# Patient Record
Sex: FEMALE | Race: AMERICAN INDIAN OR ALASKA NATIVE
[De-identification: names, ages, dates, MRNs, and addresses within clinical notes are randomized per-mention and may not be internally consistent; named-entity substitution may affect disease eponyms.]

---

## 2018-08-16 ENCOUNTER — HOSPITAL ENCOUNTER (EMERGENCY)
Dept: HOSPITAL 5 - ED | Age: 23
Discharge: LEFT BEFORE DISCHARGE | End: 2018-08-16
Payer: MEDICAID

## 2018-08-16 DIAGNOSIS — H57.8: Primary | ICD-10-CM

## 2018-08-16 DIAGNOSIS — Z53.21: ICD-10-CM

## 2022-01-18 ENCOUNTER — HOSPITAL ENCOUNTER (INPATIENT)
Dept: HOSPITAL 5 - TRG | Age: 27
LOS: 3 days | Discharge: HOME | End: 2022-01-21
Attending: OBSTETRICS & GYNECOLOGY | Admitting: OBSTETRICS & GYNECOLOGY
Payer: MEDICAID

## 2022-01-18 DIAGNOSIS — Z3A.38: ICD-10-CM

## 2022-01-18 DIAGNOSIS — U07.1: ICD-10-CM

## 2022-01-18 PROCEDURE — 86706 HEP B SURFACE ANTIBODY: CPT

## 2022-01-18 PROCEDURE — 85027 COMPLETE CBC AUTOMATED: CPT

## 2022-01-18 PROCEDURE — 87806 HIV AG W/HIV1&2 ANTB W/OPTIC: CPT

## 2022-01-18 PROCEDURE — 80307 DRUG TEST PRSMV CHEM ANLYZR: CPT

## 2022-01-18 PROCEDURE — 86900 BLOOD TYPING SEROLOGIC ABO: CPT

## 2022-01-18 PROCEDURE — G0463 HOSPITAL OUTPT CLINIC VISIT: HCPCS

## 2022-01-18 PROCEDURE — 86901 BLOOD TYPING SEROLOGIC RH(D): CPT

## 2022-01-18 PROCEDURE — 86592 SYPHILIS TEST NON-TREP QUAL: CPT

## 2022-01-18 PROCEDURE — 86762 RUBELLA ANTIBODY: CPT

## 2022-01-18 PROCEDURE — 85007 BL SMEAR W/DIFF WBC COUNT: CPT

## 2022-01-18 PROCEDURE — 36415 COLL VENOUS BLD VENIPUNCTURE: CPT

## 2022-01-18 PROCEDURE — 59025 FETAL NON-STRESS TEST: CPT

## 2022-01-18 PROCEDURE — 99211 OFF/OP EST MAY X REQ PHY/QHP: CPT

## 2022-01-18 PROCEDURE — U0003 INFECTIOUS AGENT DETECTION BY NUCLEIC ACID (DNA OR RNA); SEVERE ACUTE RESPIRATORY SYNDROME CORONAVIRUS 2 (SARS-COV-2) (CORONAVIRUS DISEASE [COVID-19]), AMPLIFIED PROBE TECHNIQUE, MAKING USE OF HIGH THROUGHPUT TECHNOLOGIES AS DESCRIBED BY CMS-2020-01-R: HCPCS

## 2022-01-18 PROCEDURE — 85025 COMPLETE CBC W/AUTO DIFF WBC: CPT

## 2022-01-18 PROCEDURE — 86803 HEPATITIS C AB TEST: CPT

## 2022-01-18 PROCEDURE — 86850 RBC ANTIBODY SCREEN: CPT

## 2022-01-19 LAB
BENZODIAZEPINES SCREEN,URINE: (no result)
HCT VFR BLD CALC: 25 % (ref 30.3–42.9)
HCT VFR BLD CALC: 25.8 % (ref 30.3–42.9)
HGB BLD-MCNC: 8.3 GM/DL (ref 10.1–14.3)
HGB BLD-MCNC: 8.3 GM/DL (ref 10.1–14.3)
MCHC RBC AUTO-ENTMCNC: 32 % (ref 30–34)
MCHC RBC AUTO-ENTMCNC: 33 % (ref 30–34)
MCV RBC AUTO: 91 FL (ref 79–97)
MCV RBC AUTO: 91 FL (ref 79–97)
METHADONE SCREEN,URINE: (no result)
OPIATE SCREEN,URINE: (no result)
PLATELET # BLD: 262 K/MM3 (ref 140–440)
PLATELET # BLD: 266 K/MM3 (ref 140–440)
RBC # BLD AUTO: 2.75 M/MM3 (ref 3.65–5.03)
RBC # BLD AUTO: 2.82 M/MM3 (ref 3.65–5.03)

## 2022-01-19 PROCEDURE — 3E0R3BZ INTRODUCTION OF ANESTHETIC AGENT INTO SPINAL CANAL, PERCUTANEOUS APPROACH: ICD-10-PCS | Performed by: OBSTETRICS & GYNECOLOGY

## 2022-01-19 PROCEDURE — 00HU33Z INSERTION OF INFUSION DEVICE INTO SPINAL CANAL, PERCUTANEOUS APPROACH: ICD-10-PCS | Performed by: OBSTETRICS & GYNECOLOGY

## 2022-01-19 PROCEDURE — 0UQGXZZ REPAIR VAGINA, EXTERNAL APPROACH: ICD-10-PCS | Performed by: OBSTETRICS & GYNECOLOGY

## 2022-01-19 RX ADMIN — SODIUM CHLORIDE, SODIUM LACTATE, POTASSIUM CHLORIDE, AND CALCIUM CHLORIDE SCH MLS/HR: .6; .31; .03; .02 INJECTION, SOLUTION INTRAVENOUS at 05:44

## 2022-01-19 RX ADMIN — IBUPROFEN SCH MG: 600 TABLET, FILM COATED ORAL at 08:44

## 2022-01-19 RX ADMIN — IBUPROFEN SCH MG: 600 TABLET, FILM COATED ORAL at 18:38

## 2022-01-19 RX ADMIN — SODIUM CHLORIDE, SODIUM LACTATE, POTASSIUM CHLORIDE, AND CALCIUM CHLORIDE SCH MLS/HR: .6; .31; .03; .02 INJECTION, SOLUTION INTRAVENOUS at 01:50

## 2022-01-19 NOTE — PROGRESS NOTE
Labor Epidural





- Labor Epidural


Start Time: 02:40


Stop Time: 03:02


Performed by:: PATRICK GRANT


Procedure: 





Patient is requesting epidural for labor pain. H&P and labs reviewed. Procedure 

explained, questions answered, consent obtained. Patient placed in sitting 

position with monitors applied. Timeout performed immediately before start of 

procedure. Prep/drape in usual sterile fashion. Skin localized 3 mL 1% lidocaine

at L[3]-L[4] interspace. 17-gauge Touhy epidural needle advanced to VADIM with 

saline at [4] cm. No blood/CSF noted via epidural needle.  Epidural catheter 

advanced to [8] cm. Negative aspiration for blood and CSF via catheter, negative

 response to test dose 3 ml 1.5% lidocaine w/ epi. Sterile dressing applied 

followed by tape reinforcement. Patient tolerated procedure well. No immediate 

complications noted.

## 2022-01-19 NOTE — ANESTHESIA CONSULTATION
Anesthesia Consult and Med Hx


Date of service: 01/19/22





- Airway


Anesthetic Teeth Evaluation: Poor


ROM Head & Neck: Adequate


Mental/Hyoid Distance: Adequate


Mallampati Class: Class II


Intubation Access Assessment: Probably Good





- Pulmonary Exam


CTA: Yes





- Cardiac Exam


Cardiac Exam: RRR





- Pre-Operative Health Status


ASA Pre-Surgery Classification: ASA3


Proposed Anesthetic Plan: Epidural





- Pulmonary


Hx Smoking: No


Hx Asthma: No


Hx Respiratory Symptoms: No


SOB: No


COPD: No


Home Oxygen Therapy: No


Hx Pneumonia: No


Hx Sleep Apnea: No





- Cardiovascular System


Hx Hypertension: No





- Central Nervous System


Hx Seizures: No


Hx Psychiatric Problems: No





- Endocrine


Hx Renal Disease: No


Hx End Stage Renal Disease: No


Hx Hypothyroidism: No


Hx Hyperthyroidism: No





- Hematic


Hx Anemia: Yes


Hx Sickle Cell Disease: No





- Other Systems


Hx Alcohol Use: No

## 2022-01-19 NOTE — HISTORY AND PHYSICAL REPORT
History of Present Illness


Date of examination: 22


Date of admission: 


22 00:30





Chief complaint: 





LOF followed by CTX


History of present illness: 





26 y/o  at 38 weeks presents to [facility] reporting LOF at 4:00 pm on 

2022.  CTX started thereafter.  No VB.  Good FM.





In [facility], SVE was 4 cm.





She is admitted in labor.





Past History


Past Medical History: no pertinent history


Past Surgical History: no surgical history


Family/Genetic History: none


Social history: no significant social history





- Obstetrical History


Expected Date of Delivery: 22


Actual Gestation: 38 Week(s) 0 Day(s) 


: 2


Para: 1





Medications and Allergies


                                    Allergies











Allergy/AdvReac Type Severity Reaction Status Date / Time


 


No Known Allergies Allergy   Unverified 10/20/15 18:55











                                Home Medications











 Medication  Instructions  Recorded  Confirmed  Last Taken  Type


 


No Known Home Medications [No  10/21/15 10/21/15 Unknown History





Reported Home Medications]     











Active Meds: 


Active Medications





Carboprost Tromethamine (Carboprost Tromethamine 250 Mcg/1 Ml Inj)  250 mcg IM 

ONCE PRN


   PRN Reason: Uterine Bleeding


Ephedrine Sulfate (Ephedrine Sulfate 50 Mg/1 Ml Inj)  10 mg IV Q2M PRN


   PRN Reason: Hypotension


Oxytocin/Sodium Chloride (Pitocin/Ns 30 Unit/500ml)  30 units in 500 mls @ 2 

mls/hr IV TITR SUMANTH; Protocol


Lactated Ringer's (Lactated Ringers)  1,000 mls @ 125 mls/hr IV AS DIRECT SUMANTH


   Last Admin: 22 05:44 Dose:  125 mls/hr


   


Oxytocin/Sodium Chloride (Pitocin/Ns 30 Unit/500ml)  30 units in 500 mls @ 40 

mls/hr IV TITR SUMANTH; Protocol


Fentanyl/Bupivacaine/Sodium Chlor (Fentanyl-Bupiv 2 Mcg/Ml-0.125%)  200 mcg in 

100 mls @ 12 mls/hr EPIDURAL TITR SUMANTH; Protocol


   Last Admin: 22 04:02 Dose:  12 mls/hr


   


Loperamide HCl (Loperamide 2 Mg Cap)  2 mg PO ONCE PRN


   PRN Reason: give with Hemabate


Methylergonovine Maleate (Methylergonovine Maleate 0.2 Mg/Ml Vial)  0.2 mg IM 

ONCE PRN


   PRN Reason: Uterine Bleeding


Mineral Oil (Mineral Oil 30 Ml Oral Liqd)  30 ml PO QHS PRN


   PRN Reason: Constipation


Misoprostol (Misoprostol 200 Mcg Tab)  800 mcg [place] ONCE PRN


   PRN Reason: Uterine Bleeding


Naloxone HCl (Naloxone 2 Mg/2 Ml Inj)  0.2 mg IV Q5M PRN


   PRN Reason: Respiratory sedation


Oxytocin (Oxytocin 10 Unit/1 Ml Inj)  10 unit IM ONCE PRN


   PRN Reason: Uterine Bleeding


Terbutaline Sulfate (Terbutaline 1 Mg/1 Ml Inj)  0.25 mg SUB-Q ONCE PRN


   PRN Reason: Hyperstimulation/Hypertonicity











Review of Systems


All systems: negative





- Vital Signs


Vital signs: 


                                   Vital Signs











Temp Pulse Resp BP Pulse Ox


 


 98.1 F   88   12   126/70   98 


 


 22 00:21  22 00:21  22 00:21  22 00:22 00:21








                                        











Temp Pulse Resp BP Pulse Ox


 


 98.1 F   87   12   116/52   93 


 


 22 00:22 06:55  22 00:22 06:55  22 06:55














- Physical Exam


Breasts: Positive: normal


Cardiovascular: Regular rate


Lungs: Positive: Normal air movement


Abdomen: Positive: normal appearance


Genitourinary (Female): Positive: normal external genitalia


Vulva: both: normal


Vagina: Positive: normal moisture


Uterus: Positive: enlarged


Adnexa: both: normal


Anus/Rectum: Positive: normal perianal skin


Extremities: Positive: normal


Deep Tendon Reflex Grade: Normal +2





- Obstetrical


FHR: category 1


Uterine Contraction Monitor Mode: External


Cervical Dilatation: 4





Results


Result Diagrams: 


                                 22 01:10





                              Abnormal lab results











  22 Range/Units





  01:10 


 


WBC  13.3 H  (4.5-11.0)  K/mm3


 


RBC  2.75 L  (3.65-5.03)  M/mm3


 


Hgb  8.3 L  (10.1-14.3)  gm/dl


 


Hct  25.0 L  (30.3-42.9)  %


 


RDW  16.5 H  (13.2-15.2)  %








All other labs normal.








Assessment and Plan





- Patient Problems


(1) 38 weeks gestation of pregnancy


Current Visit: Yes   Status: Acute   


Plan to address problem: 


Prenatal care at [facility].





Need records from office.





GBS status unknown at this time.  Will follow CDC MMWR 2010 guidelines.








(2) Labor abnormal


Current Visit: Yes   Status: Acute   


Plan to address problem: 


Admit to L&D.





Expectant management for now.

## 2022-01-19 NOTE — PROCEDURE NOTE
OB Delivery Note





- Delivery


Date of Delivery: 22


Surgeon: JOSEF RIBEIRO


Estimated blood loss: 300cc





- Vaginal


Delivery position: OA


Intrapartum events: none


Delivery induction: none


Delivery monitor: external FHT


Route of delivery: 


Delivery placenta: spontaneous


Episiotomy: none


Delivery laceration: vaginal side wall


Delivery repair: vicryl


Anesthesia: epidural





- Infant


  ** A


Apgar at 1 minute: 8


Apgar at 5 minutes: 9


Infant Gender: Male

## 2022-01-20 RX ADMIN — IBUPROFEN SCH MG: 600 TABLET, FILM COATED ORAL at 21:19

## 2022-01-20 RX ADMIN — IBUPROFEN SCH MG: 600 TABLET, FILM COATED ORAL at 00:07

## 2022-01-20 RX ADMIN — IBUPROFEN SCH MG: 600 TABLET, FILM COATED ORAL at 07:00

## 2022-01-20 RX ADMIN — HYDROCODONE BITARTRATE AND ACETAMINOPHEN PRN EACH: 5; 325 TABLET ORAL at 21:22

## 2022-01-20 RX ADMIN — HYDROCODONE BITARTRATE AND ACETAMINOPHEN PRN EACH: 5; 325 TABLET ORAL at 16:22

## 2022-01-20 RX ADMIN — HYDROCODONE BITARTRATE AND ACETAMINOPHEN PRN EACH: 5; 325 TABLET ORAL at 08:37

## 2022-01-20 RX ADMIN — IBUPROFEN SCH MG: 600 TABLET, FILM COATED ORAL at 12:31

## 2022-01-20 RX ADMIN — HYDROCODONE BITARTRATE AND ACETAMINOPHEN PRN EACH: 5; 325 TABLET ORAL at 01:58

## 2022-01-20 NOTE — PROGRESS NOTE
Assessment and Plan


PPD#1 , Sars Cov2+ doing well; leucocytosis noted


1. Routine postpartum care with isolation.  


2. Pt told that baby has to be observed for 48hrs per peds therefore no 

discharge home today


3. Will repeat cbc in am with leucocytosis seen


All questions encouraged and answered





Subjective


Date of service: 22


Principal diagnosis: PPD#1 , Sars CoV+


Interval history: 


pt has no complaints except lower back pain intermittent that is relieved with 

motrin.  Denies dysuria.  Vag bleed less than a period.  Bottle feeding only.  

Declines birth control.  Pt is ready to go home today.





Objective





- Constitutional


Vitals: 


                               Vital Signs - 12hr











  22





  08:10 08:40


 


Temperature 98.1 F 


 


Pulse Rate 60 


 


Respiratory 20 





Rate  


 


Blood Pressure 119/62 


 


O2 Sat by Pulse 98 





Oximetry  


 


O2 Sat by Pulse  98





Oximetry [  





Bilateral]  











General appearance: Present: no acute distress





- Respiratory


Respiratory effort: normal





- Cardiovascular


Rhythm: regular


Extremities: No edema





- Gastrointestinal


General gastrointestinal: Present: soft, non-tender





- Genitourinary


Female genitourinary: other (Fundus firm 2cm below umbilicus and non-tender; 

lochia small)





- Integumentary


Integumentary: warm, dry





- Neurologic


Neurologic: moves all extremities





- Psychiatric


Psychiatric: cooperative





- Labs


CBC & Chem 7: 


                                 22 10:39








Medications & Allergies





- Medications


Allergies/Adverse Reactions: 


                                    Allergies





No Known Allergies Allergy (Unverified 10/20/15 18:55)


   








Home Medications: 


                                Home Medications











 Medication  Instructions  Recorded  Confirmed  Last Taken  Type


 


No Known Home Medications [No  10/21/15 10/21/15 Unknown History





Reported Home Medications]     











Active Medications: 














Generic Name Dose Route Start Last Admin





  Trade Name Freq  PRN Reason Stop Dose Admin


 


Acetaminophen  650 mg  22 08:00 





  Acetaminophen 325 Mg Tab  PO  





  Q4H PRN  





  Pain MILD(1-3)/Fever >100.5/HA  


 


Hydrocodone Bitart/Acetaminophen  2 each  22 08:00  22 08:37





  Hydrocodone/Acetaminophen 5-325 Mg Tab  PO   2 each





  Q6H PRN   Administration





  Pain, Moderate (4-6)  


 


Bisacodyl  10 mg  22 10:00 





  Bisacodyl 10 Mg Rect Supp  SC  





  BID PRN  





  Constipation  


 


Carboprost Tromethamine  250 mcg  22 01:27 





  Carboprost Tromethamine 250 Mcg/1 Ml Inj  IM  





  ONCE PRN  





  Uterine Bleeding  


 


Diphenhydramine HCl  25 mg  22 07:30 





  Diphenhydramine 25 Mg Cap  PO  





  Q6H PRN  





  Itching  


 


Ephedrine Sulfate  10 mg  22 02:40 





  Ephedrine Sulfate 50 Mg/1 Ml Inj  IV  





  Q2M PRN  





  Hypotension  


 


Oxytocin/Sodium Chloride  30 units in 500 mls @ 2 mls/hr  22 02:00 





  Pitocin/Ns 30 Unit/500ml  IV  





  TITR SUMANTH  





  Protocol  


 


Lactated Ringer's  1,000 mls @ 125 mls/hr  22 01:30  22 05:44





  Lactated Ringers  IV   125 mls/hr





  AS DIRECT SUMANTH   Administration


 


Oxytocin/Sodium Chloride  30 units in 500 mls @ 40 mls/hr  22 02:00 





  Pitocin/Ns 30 Unit/500ml  IV  





  TITR SUMANTH  





  Protocol  


 


Fentanyl/Bupivacaine/Sodium Chlor  200 mcg in 100 mls @ 12 mls/hr  22 

03:00  22 04:02





  Fentanyl-Bupiv 2 Mcg/Ml-0.125%  EPIDURAL   12 mls/hr





  TITR SUMANTH   Administration





  Protocol  


 


Ibuprofen  600 mg  22 08:00  22 12:31





  Ibuprofen 600 Mg Tab  PO   600 mg





  Q6H SUMANTH   Administration


 


Loperamide HCl  2 mg  22 01:27 





  Loperamide 2 Mg Cap  PO  





  ONCE PRN  





  give with Hemabate  


 


Magnesium Hydroxide  30 ml  22 22:00 





  Magnesium Hydroxide (Mom) Oral Liqd Udc  PO  





  HS PRN  





  Constipation  


 


Methylergonovine Maleate  0.2 mg  22 01:27 





  Methylergonovine Maleate 0.2 Mg/Ml Vial  IM  





  ONCE PRN  





  Uterine Bleeding  


 


Mineral Oil  30 ml  22 01:27 





  Mineral Oil 30 Ml Oral Liqd  PO  





  QHS PRN  





  Constipation  


 


Misoprostol  800 mcg  22 01:27 





  Misoprostol 200 Mcg Tab  SC  





  ONCE PRN  





  Uterine Bleeding  


 


Multi-Ingredient Ointment  1 applic  22 08:00  22 10:15





  Lanolin/Zinc/Dimethicone (Lansinoh) 7 Gm  TP   1 applic





  PRN PRN   Administration





  Sore Nipples  


 


Naloxone HCl  0.2 mg  22 02:40 





  Naloxone 2 Mg/2 Ml Inj  IV  





  Q5M PRN  





  Respiratory sedation  


 


Ondansetron HCl  4 mg  22 08:00 





  Ondansetron 4 Mg/2 Ml Inj  IV  





  Q8H PRN  





  Nausea And Vomiting  


 


Oxytocin  10 unit  22 01:27 





  Oxytocin 10 Unit/1 Ml Inj  IM  





  ONCE PRN  





  Uterine Bleeding  


 


Promethazine HCl  25 mg  22 08:00 





  Promethazine 25 Mg Rect Supp  SC  





  Q6H PRN  





  Nausea And Vomiting  


 


Promethazine HCl  25 mg  22 08:00 





  Promethazine 25 Mg Tab  PO  





  Q6H PRN  





  Nausea And Vomiting  


 


Sodium Chloride  10 ml  22 08:00 





  Sodium Chloride 0.9% 10 Ml Flush Syringe  IV  22 07:59 





  PRN NR  


 


Terbutaline Sulfate  0.25 mg  22 01:27 





  Terbutaline 1 Mg/1 Ml Inj  SUB-Q  





  ONCE PRN  





  Hyperstimulation/Hypertonicity  


 


Witch Hazel/Glycerin  1 each  22 08:00 





  Witch Hazel/ Glycerin Pad  TP  





  PRN PRN  





  Hemorrhoid/cleansing/soothing

## 2022-01-21 VITALS — DIASTOLIC BLOOD PRESSURE: 77 MMHG | SYSTOLIC BLOOD PRESSURE: 137 MMHG

## 2022-01-21 LAB
%HYPO/RBC NFR BLD AUTO: (no result) %
BAND NEUTROPHILS # (MANUAL): 0 K/MM3
HCT VFR BLD CALC: 24.9 % (ref 30.3–42.9)
HGB BLD-MCNC: 7.9 GM/DL (ref 10.1–14.3)
MCHC RBC AUTO-ENTMCNC: 32 % (ref 30–34)
MCV RBC AUTO: 91 FL (ref 79–97)
MYELOCYTES # (MANUAL): 0 K/MM3
OVALOCYTES BLD QL SMEAR: (no result)
PLATELET # BLD: 298 K/MM3 (ref 140–440)
PROMYELOCYTES # (MANUAL): 0 K/MM3
RBC # BLD AUTO: 2.73 M/MM3 (ref 3.65–5.03)
TOTAL CELLS COUNTED BLD: 100

## 2022-01-21 RX ADMIN — HYDROCODONE BITARTRATE AND ACETAMINOPHEN PRN EACH: 5; 325 TABLET ORAL at 11:38

## 2022-01-21 RX ADMIN — IBUPROFEN SCH MG: 600 TABLET, FILM COATED ORAL at 02:26

## 2022-01-21 RX ADMIN — IBUPROFEN SCH MG: 600 TABLET, FILM COATED ORAL at 10:13

## 2022-01-21 NOTE — DISCHARGE SUMMARY
Providers





- Providers


Date of Admission: 


22 00:30





Date of discharge: 22


Attending physician: 


SHAILESH TUCKER





Primary care physician: 


SHAILESH TUCKER








Hospitalization


Reason for admission: rupture of membranes


Delivery: 


Laceration: vaginal side wall


Other postpartum procedures: none


Postpartum complications: none


Discharge diagnosis: IUP at term delivered


 baby: male


Pertinent studies: 


Labs





Hospital course: 


Hospital course complicated by anemia (asymptomatic; received iron 

supplementation) and coronavirus positivity (asymptomatic).





Condition at discharge: Good


Disposition: 01 HOME / SELF CARE / HOMELESS





- Discharge Diagnoses


(1) Term pregnancy delivered


Status: Acute   





(2) Anemia


Status: Acute   





Plan





- Provider Discharge Summary


Activity: routine, no sex for 6 weeks, no heavy lifting 4 weeks, no strenuous 

exercise


Diet: routine


Instructions: routine


Additional instructions: 


Continue taking your prenatal vitamin daily at home.


Take your iron supplement every 8 hours at home. 


Self isolate at home for 2 weeks to avoid spreading coronavirus; if you develop 

symptoms, return promptly.


Follow up at [facility] office in 2 weeks. 





Call your doctor immediately for:


* Fever > 100.5


* Heavy vaginal bleeding ( >1 pad per hour)


* Severe persistent headache


* Shortness of breath


* Reddened, hot, painful area to leg or breast








- Follow up plan


Follow up: 


ALISON GRADY CNM [Advanced Practice Nurse] - 14 Days


Forms:  [facility] Discharge Summary

## 2022-01-21 NOTE — PROGRESS NOTE
Assessment and Plan


A: Postpartum day 2 S/P .


Anemia. 


Coronavirus positive (asymptomatic).


P: Discharge patient home today. Discussed with patient postpartum discharge 

instructions and warning signs. Advised patient to continue taking her prenatal 

vitamin daily and iron supplement TID at home (Rx for prenatal vitamin and 

Ferrous Sulfate called to [facility] pharmacy in [place], [place]). Advised patient to 

self isolate at home for 2 weeks to avoid spreading coronavirus and also to 

return to hospital promptly if any symptoms develop. Advised patient to avoid in

tercourse, lifting, and heavy housework. Advised patient to follow up at [facility] office in 2 weeks. Patient voiced understanding of all 

instructions. 








Subjective





- Subjective


Date of service: 22


Principal diagnosis: PPD#2 , Sars CoV+


Interval history: 


Patient denies cough, shortness of breath, chest pain, fever or chills. 


Patient declines chest x-ray. 


Positive for coronavirus but is asymptomatic.


Patient desires discharge home today. 


Reports small amount of lochia.





Patient reports: appetite normal, voiding normally, pain well controlled, 

flatus, ambulating normally, no dizzy ambulation, no nauseated


: doing well





Objective





- Vital Signs


Latest vital signs: 


                                   Vital Signs











  Temp Pulse Resp BP BP Pulse Ox Pulse Ox


 


 22 08:29        97


 


 22 08:22  98.2 F  58 L  20  132/74   100 


 


 22 00:00  98.8 F  69  16   104/78  


 


 22 22:19        99


 


 22 21:22    18    


 


 22 21:19    18     98


 


 22 19:15        98


 


 22 16:14  98.0 F  78  20  129/81   97 








                                Intake and Output











 22





 23:59 07:59 15:59


 


Intake Total 500 100 


 


Balance 500 100 


 


Intake:   


 


  Oral 200 100 


 


  Intake, Free Water 300  


 


Other:   


 


  Total, Intake Amount 200 100 


 


  # Voids   


 


    Self-Catheterization   1


 


    Void 1 1 














- Exam


Cardiovascular: Present: Regular rate, No murmurs


Lungs: Present: Clear to auscultation


Abdomen: Present: normal appearance, soft, normal bowel sounds.  Absent: 

distention, tenderness, guarding, rigidity


Uterus: Present: normal, firm, fundal height below umbilicus.  Absent: 

bogginess, tenderness


Extremities: Absent: tenderness





- Labs


Labs: 


                              Abnormal lab results











  22 Range/Units





  05:15 


 


WBC  13.6 H  (4.5-11.0)  K/mm3


 


RBC  2.73 L  (3.65-5.03)  M/mm3


 


Hgb  7.9 L  (10.1-14.3)  gm/dl


 


Hct  24.9 L  (30.3-42.9)  %


 


RDW  16.7 H  (13.2-15.2)  %


 


Seg Neutrophils # Man  9.2 H  (1.8-7.7)  K/mm3